# Patient Record
Sex: FEMALE | Race: ASIAN
[De-identification: names, ages, dates, MRNs, and addresses within clinical notes are randomized per-mention and may not be internally consistent; named-entity substitution may affect disease eponyms.]

---

## 2024-02-22 ENCOUNTER — APPOINTMENT (OUTPATIENT)
Dept: ORTHOPEDIC SURGERY | Facility: CLINIC | Age: 39
End: 2024-02-22
Payer: MEDICAID

## 2024-02-22 ENCOUNTER — NON-APPOINTMENT (OUTPATIENT)
Age: 39
End: 2024-02-22

## 2024-02-22 VITALS — BODY MASS INDEX: 37.73 KG/M2 | HEIGHT: 62 IN | WEIGHT: 205 LBS

## 2024-02-22 DIAGNOSIS — D16.11 BENIGN NEOPLASM OF SHORT BONES OF RIGHT UPPER LIMB: ICD-10-CM

## 2024-02-22 DIAGNOSIS — S62.664A NONDISPLACED FRACTURE OF DISTAL PHALANX OF RIGHT RING FINGER, INITIAL ENCOUNTER FOR CLOSED FRACTURE: ICD-10-CM

## 2024-02-22 PROBLEM — Z00.00 ENCOUNTER FOR PREVENTIVE HEALTH EXAMINATION: Status: ACTIVE | Noted: 2024-02-22

## 2024-02-22 PROCEDURE — 99203 OFFICE O/P NEW LOW 30 MIN: CPT

## 2024-02-22 PROCEDURE — 73140 X-RAY EXAM OF FINGER(S): CPT

## 2024-02-22 NOTE — DISCUSSION/SUMMARY
[FreeTextEntry1] : The underlying pathophysiology was reviewed with the patient. XR films were reviewed with the patient. Discussed at length the nature of the patients condition. 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 She was instructed to take Advil for her pain and follow up in one month.  All questions answered, understanding verbalized. Patient in agreement with plan of care.

## 2024-02-22 NOTE — HISTORY OF PRESENT ILLNESS
[FreeTextEntry1] : Pt is a 38 year old female with right ring finger pain for 1 week.  She sustained mechanical fall hitting her head.  She has pain anterior aspect of the finger with swelling.  This is worse with flexing and extending.  She was evaluated by her PCP she was sent for x-rays at [facility] radiology.  She does not take anything for the pain.

## 2024-02-22 NOTE — ADDENDUM
[FreeTextEntry1] : I, Aubrey Mathur, wrote this note acting as a scribe for Dr. Tato Stoddard on 2/22/24.

## 2024-02-22 NOTE — PHYSICAL EXAM
[de-identified] : Patient is WDWN, alert, and in no acute distress. Breathing is unlabored. She is grossly oriented to person, place, and time.     [de-identified] :  AP, lateral, and oblique radiographs of the right ring finger demonstrate an enchondroma of the distal phalanx of the ring finger and a tuft fracture .

## 2024-02-22 NOTE — END OF VISIT
[FreeTextEntry3] : All medical record entries made by the Scribe were at my,  Dr. Tato Stoddard MD., direction and personally dictated by me on 2/22/24. I have personally reviewed the chart and agree that the record accurately reflects my personal performance of the history, physical exam, assessment and plan.

## 2024-03-21 ENCOUNTER — APPOINTMENT (OUTPATIENT)
Dept: ORTHOPEDIC SURGERY | Facility: CLINIC | Age: 39
End: 2024-03-21